# Patient Record
Sex: FEMALE | Race: BLACK OR AFRICAN AMERICAN | NOT HISPANIC OR LATINO | Employment: STUDENT
[De-identification: names, ages, dates, MRNs, and addresses within clinical notes are randomized per-mention and may not be internally consistent; named-entity substitution may affect disease eponyms.]

---
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
## 2022-05-18 ENCOUNTER — LAB REQUISITION (OUTPATIENT)
Dept: LAB | Facility: CLINIC | Age: 22
End: 2022-05-18
Payer: COMMERCIAL

## 2022-05-18 DIAGNOSIS — Z11.3 ENCOUNTER FOR SCREENING FOR INFECTIONS WITH A PREDOMINANTLY SEXUAL MODE OF TRANSMISSION: ICD-10-CM

## 2022-05-18 DIAGNOSIS — R35.0 FREQUENCY OF MICTURITION: ICD-10-CM

## 2022-05-18 PROCEDURE — 87086 URINE CULTURE/COLONY COUNT: CPT | Mod: ORL | Performed by: NURSE PRACTITIONER

## 2022-05-18 PROCEDURE — 87491 CHLMYD TRACH DNA AMP PROBE: CPT | Mod: ORL | Performed by: NURSE PRACTITIONER

## 2022-05-20 LAB
BACTERIA UR CULT: NORMAL
C TRACH DNA SPEC QL PROBE+SIG AMP: NEGATIVE
N GONORRHOEA DNA SPEC QL NAA+PROBE: NEGATIVE

## 2022-05-25 ENCOUNTER — LAB REQUISITION (OUTPATIENT)
Dept: LAB | Facility: CLINIC | Age: 22
End: 2022-05-25
Payer: COMMERCIAL

## 2022-05-25 DIAGNOSIS — N89.8 OTHER SPECIFIED NONINFLAMMATORY DISORDERS OF VAGINA: ICD-10-CM

## 2022-05-25 LAB
NUGENT SCORE: 2
WHITE BLOOD CELLS: NORMAL

## 2022-05-25 PROCEDURE — 87205 SMEAR GRAM STAIN: CPT | Mod: ORL | Performed by: NURSE PRACTITIONER

## 2022-05-25 PROCEDURE — 87102 FUNGUS ISOLATION CULTURE: CPT | Mod: ORL | Performed by: NURSE PRACTITIONER

## 2022-05-30 LAB — BACTERIA SPEC CULT: NO GROWTH

## 2022-07-27 ENCOUNTER — LAB REQUISITION (OUTPATIENT)
Dept: LAB | Facility: CLINIC | Age: 22
End: 2022-07-27
Payer: COMMERCIAL

## 2022-07-27 DIAGNOSIS — R35.0 FREQUENCY OF MICTURITION: ICD-10-CM

## 2022-07-27 PROCEDURE — 87102 FUNGUS ISOLATION CULTURE: CPT | Mod: ORL | Performed by: NURSE PRACTITIONER

## 2022-07-27 PROCEDURE — 87205 SMEAR GRAM STAIN: CPT | Mod: ORL | Performed by: NURSE PRACTITIONER

## 2022-07-27 PROCEDURE — 87491 CHLMYD TRACH DNA AMP PROBE: CPT | Mod: ORL | Performed by: NURSE PRACTITIONER

## 2022-07-28 LAB
BACTERIAL VAGINOSIS SMEAR: ABNORMAL
C TRACH DNA SPEC QL PROBE+SIG AMP: NEGATIVE
N GONORRHOEA DNA SPEC QL NAA+PROBE: NEGATIVE
NUGENT SCORE: 4
WHITE BLOOD CELLS: ABNORMAL

## 2022-08-01 LAB — BACTERIA SPEC CULT: NO GROWTH

## 2023-01-12 ENCOUNTER — LAB REQUISITION (OUTPATIENT)
Dept: LAB | Facility: CLINIC | Age: 23
End: 2023-01-12
Payer: COMMERCIAL

## 2023-01-12 DIAGNOSIS — R30.0 DYSURIA: ICD-10-CM

## 2023-01-12 LAB
NUGENT SCORE: 4
WHITE BLOOD CELLS: ABNORMAL

## 2023-01-12 PROCEDURE — 87205 SMEAR GRAM STAIN: CPT | Mod: ORL | Performed by: NURSE PRACTITIONER

## 2023-01-12 PROCEDURE — 87102 FUNGUS ISOLATION CULTURE: CPT | Mod: ORL | Performed by: NURSE PRACTITIONER

## 2023-01-14 ENCOUNTER — LAB REQUISITION (OUTPATIENT)
Dept: LAB | Facility: CLINIC | Age: 23
End: 2023-01-14
Payer: COMMERCIAL

## 2023-01-14 DIAGNOSIS — N76.0 ACUTE VAGINITIS: ICD-10-CM

## 2023-01-14 LAB
NUGENT SCORE: 2
WHITE BLOOD CELLS: NORMAL

## 2023-01-14 PROCEDURE — 87088 URINE BACTERIA CULTURE: CPT | Mod: ORL | Performed by: PEDIATRICS

## 2023-01-14 PROCEDURE — 87205 SMEAR GRAM STAIN: CPT | Mod: ORL | Performed by: PEDIATRICS

## 2023-01-15 LAB — BACTERIA UR CULT: ABNORMAL

## 2023-01-16 LAB — BACTERIA SPEC CULT: NO GROWTH

## 2023-02-14 ENCOUNTER — LAB REQUISITION (OUTPATIENT)
Dept: LAB | Facility: CLINIC | Age: 23
End: 2023-02-14
Payer: COMMERCIAL

## 2023-02-14 LAB
NUGENT SCORE: 1
T VAGINALIS DNA SPEC QL NAA+PROBE: NOT DETECTED
WHITE BLOOD CELLS: NORMAL

## 2023-02-14 PROCEDURE — 87491 CHLMYD TRACH DNA AMP PROBE: CPT | Mod: ORL | Performed by: NURSE PRACTITIONER

## 2023-02-14 PROCEDURE — 87102 FUNGUS ISOLATION CULTURE: CPT | Mod: ORL | Performed by: NURSE PRACTITIONER

## 2023-02-14 PROCEDURE — 87661 TRICHOMONAS VAGINALIS AMPLIF: CPT | Mod: ORL | Performed by: NURSE PRACTITIONER

## 2023-02-14 PROCEDURE — 87205 SMEAR GRAM STAIN: CPT | Mod: ORL | Performed by: NURSE PRACTITIONER

## 2023-02-15 LAB
C TRACH DNA SPEC QL PROBE+SIG AMP: NEGATIVE
N GONORRHOEA DNA SPEC QL NAA+PROBE: NEGATIVE

## 2023-02-18 LAB — BACTERIA SPEC CULT: NO GROWTH

## 2023-12-22 ENCOUNTER — OFFICE VISIT (OUTPATIENT)
Dept: URGENT CARE | Facility: URGENT CARE | Age: 23
End: 2023-12-22
Payer: COMMERCIAL

## 2023-12-22 VITALS
HEART RATE: 68 BPM | DIASTOLIC BLOOD PRESSURE: 73 MMHG | OXYGEN SATURATION: 98 % | SYSTOLIC BLOOD PRESSURE: 113 MMHG | WEIGHT: 170 LBS | RESPIRATION RATE: 18 BRPM | TEMPERATURE: 97.8 F

## 2023-12-22 DIAGNOSIS — S46.812A STRAIN OF LEFT TRAPEZIUS MUSCLE, INITIAL ENCOUNTER: Primary | ICD-10-CM

## 2023-12-22 PROCEDURE — 99203 OFFICE O/P NEW LOW 30 MIN: CPT | Performed by: PHYSICIAN ASSISTANT

## 2023-12-22 RX ORDER — CYCLOBENZAPRINE HCL 10 MG
10 TABLET ORAL 3 TIMES DAILY PRN
Qty: 30 TABLET | Refills: 0 | Status: SHIPPED | OUTPATIENT
Start: 2023-12-22

## 2023-12-22 ASSESSMENT — ENCOUNTER SYMPTOMS
NUMBNESS: 0
SPEECH DIFFICULTY: 0
DECREASED CONCENTRATION: 1
NERVOUS/ANXIOUS: 0
CONFUSION: 0
TREMORS: 0
LIGHT-HEADEDNESS: 1
HEADACHES: 1

## 2023-12-22 NOTE — PROGRESS NOTES
Strain of left trapezius muscle, initial encounter  - cyclobenzaprine (FLEXERIL) 10 MG tablet; Take 1 tablet (10 mg) by mouth 3 times daily as needed for muscle spasms    Rest the affected area as much as possible.  Apply ice for 15-20 minutes intermittently as needed and especially after any offending activity. Hot packs are better for muscle spasms and cramping. Daily stretching as tolerated.  As pain recedes, begin normal activities slowly as tolerated.  Consider Physical Therapy after 6 weeks if symptoms not better with conservative care.      Okay to take acetaminophen 500 mg- 2 tabs (Total of 1000 mg) every 8 hrs   Okay to take ibuprofen 200 mg- 3 tabs (Total of 600 mg) every 6 hours      Patient was advised to return to clinic for reevaluation (either UC or PCP) if symptoms do not improve in 7 days. Patient educated on red flag symptoms and asked to go directly to the ED if these symptoms present themselves.     Jaime Gonzales PA-C  [facility]    Subjective   23 year old who presents to clinic today for the following health issues:    Urgent Care and Neck Pain       HPI     Where in your body do you have pain? Neck Pain  Onset/Duration: Neck pain x5 days. Hit back of head Saturday on the Lt side toward the middle. Pt tried to stretch but not helping. Thinking affected. Patient states that their head did not hurt immediately after the injury but began hurting 2 days after the injury.  Description:   Location: Left side of neck- Sharp in nature.  Radiation: Slightly down the left upper arm and shoulder blade   Intensity: 6/10  Progression of Symptoms:  waxing and waning  Accompanying Signs & Symptoms:  Burning, tingling, prickly sensation in arm(s): No  Numbness in arm(s): No  Weakness in arm(s):  No  Fever: No  Headache: YES- Mild occipital headaches   Nausea and/or vomiting: YES  Mild lightheadedness infrequently- Denies any vertigo.    History:   Trauma: YES  Previous neck pain:  No  Previous surgery or injections: No  Previous Imaging (MRI,X ray): No  Precipitating or alleviating factors: None  Does movement impact the pain:  Twisting to the right and tilting head the the left   Therapies tried and outcome: rest/inactivity, heat, ice, and stretching    Review of Systems   Review of Systems   Neurological:  Positive for light-headedness and headaches. Negative for tremors, syncope, speech difficulty and numbness.   Psychiatric/Behavioral:  Positive for decreased concentration. Negative for confusion. Sleep disturbance: Seems to be hard to sleep due to the pain but patient does feel tired..The patient is not nervous/anxious.       See HPI    Objective    Temp: 97.8  F (36.6  C) Temp src: Temporal BP: 113/73 Pulse: 68   Resp: 18 SpO2: 98 %       Physical Exam   Physical Exam  Constitutional:       General: She is not in acute distress.     Appearance: Normal appearance. She is normal weight. She is not ill-appearing, toxic-appearing or diaphoretic.   HENT:      Head: Normocephalic and atraumatic.   Cardiovascular:      Rate and Rhythm: Normal rate.      Pulses: Normal pulses.   Pulmonary:      Effort: Pulmonary effort is normal. No respiratory distress.   Musculoskeletal:        Back:       Comments: Patient has movement pain in the area shown above without focal tenderness, swelling, deformity, or erythema.    Neurological:      General: No focal deficit present.      Mental Status: She is alert and oriented to person, place, and time. Mental status is at baseline.      Gait: Gait normal.   Psychiatric:         Mood and Affect: Mood normal.         Behavior: Behavior normal.         Thought Content: Thought content normal.         Judgment: Judgment normal.          No results found for this or any previous visit (from the past 24 hour(s)).

## 2024-08-14 ENCOUNTER — LAB REQUISITION (OUTPATIENT)
Dept: LAB | Facility: CLINIC | Age: 24
End: 2024-08-14
Payer: COMMERCIAL

## 2024-08-14 DIAGNOSIS — R30.0 DYSURIA: ICD-10-CM

## 2024-08-14 PROCEDURE — 87086 URINE CULTURE/COLONY COUNT: CPT | Mod: ORL | Performed by: SPECIALIST

## 2024-08-14 PROCEDURE — 87491 CHLMYD TRACH DNA AMP PROBE: CPT | Mod: ORL | Performed by: SPECIALIST

## 2024-08-15 LAB
C TRACH DNA SPEC QL PROBE+SIG AMP: NEGATIVE
N GONORRHOEA DNA SPEC QL NAA+PROBE: NEGATIVE

## 2024-08-17 LAB — BACTERIA UR CULT: ABNORMAL
